# Patient Record
Sex: FEMALE | Race: WHITE
[De-identification: names, ages, dates, MRNs, and addresses within clinical notes are randomized per-mention and may not be internally consistent; named-entity substitution may affect disease eponyms.]

---

## 2018-11-13 ENCOUNTER — HOSPITAL ENCOUNTER (EMERGENCY)
Dept: HOSPITAL 80 - FED | Age: 23
LOS: 1 days | Discharge: HOME | End: 2018-11-14
Payer: OTHER GOVERNMENT

## 2018-11-13 DIAGNOSIS — F12.90: ICD-10-CM

## 2018-11-13 DIAGNOSIS — G21.19: Primary | ICD-10-CM

## 2018-11-13 DIAGNOSIS — E86.9: ICD-10-CM

## 2018-11-13 DIAGNOSIS — F41.9: ICD-10-CM

## 2018-11-13 NOTE — EDPHY
H & P


Stated Complaint: "whole body shaking", anxious, smoked marijuana before 

feeling bad


Source: Patient


Exam Limitations: No limitations





- Personal History


LMP (Females 10-55): Now


Current Tetanus Diphtheria and Acellular Pertussis (TDAP): Yes





- Medical/Surgical History


Hx Asthma: No


Hx Chronic Respiratory Disease: No


Hx Diabetes: No


Hx Cardiac Disease: No


Hx Renal Disease: No


Hx Cirrhosis: No


Hx Alcoholism: No


Hx HIV/AIDS: No


Hx Splenectomy or Spleen Trauma: No


Other PMH: none.  PCP none.  [facility]





- Social History


Smoking Status: Never smoked


Time Seen by Provider: 11/13/18 23:08


HPI/ROS: 


HPI:  This is a 23-year-old female who presents with





Chief Complaint: "whole body shaking", anxious, smoked marijuana before feeling 

bad





Location:  Body


Quality:  shaking


Duration:  1 hr prior to arrival


Signs and Symptoms:  no shortness of breath at rest, no shortness of breath on 

exertion, no cough, no chest pain, no palpitations, no lower extremity edema, 

no wheezing, no orthopnea, no paroxysmal nocturnal dyspnea, no fever, no injury/

trauma, no hemoptysis, no carpal pedal spasms


Timing:  Rapid onset


Severity:  Moderate


Context:  Patient is a student at [employer] and ingested 

edibles for the 1st time this evening around 7:30 p.m.  She reports that over 

the last 1-2 hours she started to feel anxiety and her whole body has been 

shaking.  This is the 1st time that she has ever ingested edibles before.  She 

denies any fever, chest pain, shortness of breath, abdominal pain, nausea, no 

vomiting. Patient is currently on her menses.


Modifying Factors:  None





Comment: 








ROS:  A comprehensive 10 system review of systems is otherwise negative aside 

from elements mentioned in the history of present illness. 





MEDICAL/SURGICAL/SOCIAL HISTORY: 


Medical history:  Generally healthy.  Does not take any regular medications.  


Surgical history:  Denies


Social history:  Denies alcohol and tobacco use.








CONSTITUTIONAL:  Anxious, extremely well-appearing young adult white female, 

awake and alert, no obvious distress


HEENT: Atraumatic and normocephalic, PERRL, EOMI.  Nares patent; no rhinorrhea;

  no nasal mucosal edema. Tympanic membranes clear. Oropharynx clear, no 

exudate and moist pink mucosa.  Airway patent.  No lymphadenopathy.  No 

meningismus.


Cardiovascular: Normal S1/S2, tachycardia, regular rhythm, without murmur rub 

or gallop.


PULMONARY/CHEST:  Symmetrical and nontender. Clear to auscultation bilaterally. 

Good air movement. No accessory muscle usage.


ABDOMEN:  Soft, nondistended, nontender, no rebound, no guarding, no peritoneal 

signs, no masses or organomegaly. No CVAT.


EXTREMITIES:  2/2 pulses, strength 5/5, no deformities, no clubbing, no 

cyanosis or edema.


NEUROLOGICAL: no focal neuro deficits.  GCS 15.


SKIN: Warm and dry, no erythema. no rash.  Good capillary refill. 


  


 (Christie Diaz)


Constitutional: 


 Initial Vital Signs











Temperature (C)  36.7 C   11/13/18 22:58


 


Heart Rate  124 H  11/13/18 22:58


 


Respiratory Rate  19   11/13/18 22:58


 


Blood Pressure  150/99 H  11/13/18 22:58


 


O2 Sat (%)  97   11/13/18 22:58








 











O2 Delivery Mode               Room Air














Allergies/Adverse Reactions: 


 





No Known Allergies Allergy (Unverified 11/13/18 22:57)


 








Home Medications: 














 Medication  Instructions  Recorded


 


Cephalexin [Keflex] 500 mg PO QID #28 cap 10/11/16


 


Fluconazole [Diflucan (*)] 150 mg PO ONCE #2 tab 11/06/16


 


Ondansetron Odt [Zofran Odt 4 mg 4 mg PO Q4 PRN #12 tab 11/13/18





(*)]  














Medical Decision Making


ED Course/Re-evaluation: 





PHYSICIAN DOCUMENTATION:


The patient was evaluated and managed by the Physician Assistant.  My co-

signature indicates that I have reviewed this chart and I agree with the 

findings and plan of care as documented.  I am the secondary supervising 

physician.


 (Danielle Cotter)


Vital signs reviewed and show tachycardia.  Placed on cardiac monitor.


Patient is experiencing the affects of marijuana.


Given 1 L normal saline, IV Zofran 4 mg and IV Ativan 1 mg


Monitored for several hours with improvement in symptoms.


0015:  Reassessed patient with improvement in vital signs and resolution of 

tachycardia.  Friend at bedside will stay with patient.  Will give prepack for 

Zofran and school excuse for tomorrow.








This patient was seen under the supervision of my secondary supervising 

physician.  I evaluated care for this patient independently.  Discussed this 

patient with Dr. Cotter.


.  


 (Christie Diaz)


Differential Diagnosis: 


Differential diagnosis includes but is not limited to dehydration, anxiety, 

intoxicant use.


 (Christie Diaz)





- Data Points


Medications Given: 


 








Discontinued Medications





Sodium Chloride (Ns)  1,000 mls @ 0 mls/hr IV EDNOW ONE; Wide Open


   PRN Reason: Protocol


   Stop: 11/13/18 23:11


   Last Admin: 11/13/18 23:17 Dose:  1,000 mls


Lorazepam (Ativan Injection)  1 mg IVP EDNOW ONE


   Stop: 11/13/18 23:11


   Last Admin: 11/13/18 23:16 Dose:  1 mg


Ondansetron HCl (Zofran Odt 4 Mg Prepack#2)  1 btl TAKEHOME EDNOW ONE


   Stop: 11/13/18 23:54


   Last Admin: 11/14/18 00:00 Dose:  1 btl


Ondansetron HCl (Zofran)  4 mg IVP EDNOW ONE


   Stop: 11/14/18 00:07


   Last Admin: 11/14/18 00:07 Dose:  4 mg








Departure





- Departure


Disposition: Home, Routine, Self-Care


Clinical Impression: 


 Marijuana use





Condition: Good


Instructions:  Ondansetron (By mouth), Cannabidiol (By mouth), Anxiety (ED)


Additional Instructions: 


Rest as much as possible until you are feeling better.


Consume a minimum of 8-10 glasses of water or electrolyte fluid replacement 

drinks that include Gatorade, Powerade, Pedialyte. 


Eat a bland diet for the next 48 hours and then slowly advance as tolerated. 











Referrals: 


[facility] [Clinic] - As per Instructions


Stand Alone Forms:  School Excuse


Prescriptions: 


Ondansetron Odt [Zofran Odt 4 mg (*)] 4 mg PO Q4 PRN #12 tab


 PRN Reason: Nausea/Vomiting, Use 1st

## 2018-11-14 VITALS — SYSTOLIC BLOOD PRESSURE: 114 MMHG | DIASTOLIC BLOOD PRESSURE: 79 MMHG
